# Patient Record
Sex: FEMALE | Race: AMERICAN INDIAN OR ALASKA NATIVE
[De-identification: names, ages, dates, MRNs, and addresses within clinical notes are randomized per-mention and may not be internally consistent; named-entity substitution may affect disease eponyms.]

---

## 2019-04-06 ENCOUNTER — HOSPITAL ENCOUNTER (EMERGENCY)
Dept: HOSPITAL 5 - ED | Age: 24
LOS: 1 days | Discharge: HOME | End: 2019-04-07
Payer: MEDICAID

## 2019-04-06 VITALS — DIASTOLIC BLOOD PRESSURE: 79 MMHG | SYSTOLIC BLOOD PRESSURE: 124 MMHG

## 2019-04-06 DIAGNOSIS — O23.41: Primary | ICD-10-CM

## 2019-04-06 DIAGNOSIS — R81: ICD-10-CM

## 2019-04-06 DIAGNOSIS — Z3A.10: ICD-10-CM

## 2019-04-06 DIAGNOSIS — O12.11: ICD-10-CM

## 2019-04-06 DIAGNOSIS — O26.891: ICD-10-CM

## 2019-04-06 LAB
BACTERIA #/AREA URNS HPF: (no result) /HPF
BASOPHILS # (AUTO): 0.1 K/MM3 (ref 0–0.1)
BASOPHILS NFR BLD AUTO: 0.6 % (ref 0–1.8)
BILIRUB UR QL STRIP: (no result)
BLOOD UR QL VISUAL: (no result)
BUN SERPL-MCNC: 12 MG/DL (ref 7–17)
BUN/CREAT SERPL: 24 %
CALCIUM SERPL-MCNC: 8.8 MG/DL (ref 8.4–10.2)
CAOX CRY #/AREA URNS HPF: (no result) /[HPF]
EOSINOPHIL # BLD AUTO: 0.1 K/MM3 (ref 0–0.4)
EOSINOPHIL NFR BLD AUTO: 0.8 % (ref 0–4.3)
HCT VFR BLD CALC: 36.1 % (ref 30.3–42.9)
HEMOLYSIS INDEX: 10
HGB BLD-MCNC: 12.4 GM/DL (ref 10.1–14.3)
LYMPHOCYTES # BLD AUTO: 2.2 K/MM3 (ref 1.2–5.4)
LYMPHOCYTES NFR BLD AUTO: 22.9 % (ref 13.4–35)
MCHC RBC AUTO-ENTMCNC: 35 % (ref 30–34)
MCV RBC AUTO: 75 FL (ref 79–97)
MONOCYTES # (AUTO): 0.6 K/MM3 (ref 0–0.8)
MONOCYTES % (AUTO): 6.8 % (ref 0–7.3)
MUCOUS THREADS #/AREA URNS HPF: (no result) /HPF
PH UR STRIP: 6 [PH] (ref 5–7)
PLATELET # BLD: 261 K/MM3 (ref 140–440)
RBC # BLD AUTO: 4.82 M/MM3 (ref 3.65–5.03)
RBC #/AREA URNS HPF: 37 /HPF (ref 0–6)
RENAL EPI CELLS #/AREA URNS LPF: 17 /LPF
UROBILINOGEN UR-MCNC: < 2 MG/DL (ref ?–2)
WBC #/AREA URNS HPF: 108 /HPF (ref 0–6)

## 2019-04-06 PROCEDURE — 81001 URINALYSIS AUTO W/SCOPE: CPT

## 2019-04-06 PROCEDURE — 86850 RBC ANTIBODY SCREEN: CPT

## 2019-04-06 PROCEDURE — 85025 COMPLETE CBC W/AUTO DIFF WBC: CPT

## 2019-04-06 PROCEDURE — 36415 COLL VENOUS BLD VENIPUNCTURE: CPT

## 2019-04-06 PROCEDURE — 86901 BLOOD TYPING SEROLOGIC RH(D): CPT

## 2019-04-06 PROCEDURE — 80048 BASIC METABOLIC PNL TOTAL CA: CPT

## 2019-04-06 PROCEDURE — 99284 EMERGENCY DEPT VISIT MOD MDM: CPT

## 2019-04-06 PROCEDURE — 86900 BLOOD TYPING SEROLOGIC ABO: CPT

## 2019-04-06 PROCEDURE — 84702 CHORIONIC GONADOTROPIN TEST: CPT

## 2019-04-06 PROCEDURE — 76801 OB US < 14 WKS SINGLE FETUS: CPT

## 2019-04-06 NOTE — EMERGENCY DEPARTMENT REPORT
Stated Complaint: VAG BLEEDING/10WKS PREG


Time Seen by Provider: 19 21:53





- HPI


History of Present Illness: 





vag bleeding in preg





m1





mse completed


MSE screening note: 


Focused history and physical exam performed.


Due to findings the following was ordered:











ED Disposition for MSE


Condition: Stable

## 2019-04-07 NOTE — EMERGENCY DEPARTMENT REPORT
ED Pregnancy HPI





- General


Chief complaint: Vaginal Bleeding


Stated complaint: VAG BLEEDING/10WKS PREG


Time Seen by Provider: 19 21:53


Source: patient, EMS


Mode of arrival: Stretcher


Limitations: No Limitations





- History of Present Illness


Initial comments: 





24-year-old -American female that is 10 weeks pregnant presents to the 

emergency room for one hour of vaginal bleeding prior to arrival.  Patient 

denies any abdominal pain.  Patient is  2 para 0.  She reports she is 

followed by [facility].  Patient states she will have spotting but 

has not filled a pad.  She denies any dysuria denies any abdominal pain.  Has no

past medical history surgical history D&C.  Patient currently takes prenatal 

vitamins and has no known drug allergies.


MD Complaint: vaginal bleeding


-: hour(s) (1 PTA)


Radiation: none


Severity scale (0 -10): 0


Improves with: none


Worsens with: none


Associated symptoms: vaginal bleeding


Vaginal bleeding: light


Pregnant:: Yes


Number of weeks pregnant: 10


OB History - Current Pregnancy: no complications


OB History - Previous Pregnancies: miscarriage


Pre- care: followed by OB





- Related Data


: 2


Para: 0


Ab: 1


                                  Previous Rx's











 Medication  Instructions  Recorded  Last Taken  Type


 


Nitrofurantoin Monohyd/M-Cryst 100 mg PO BID #14 capsule 19 Unknown Rx





[Macrobid 100 mg Capsule]    











                                    Allergies











Allergy/AdvReac Type Severity Reaction Status Date / Time


 


No Known Allergies Allergy   Unverified 19 22:07














ED Review of Systems


ROS: 


Stated complaint: VAG BLEEDING/10WKS PREG


Other details as noted in HPI





Constitutional: denies: chills, fever


Eyes: denies: eye pain, eye discharge, vision change


ENT: denies: ear pain, throat pain


Respiratory: denies: cough, shortness of breath, wheezing


Cardiovascular: denies: chest pain, palpitations


Endocrine: no symptoms reported


Gastrointestinal: denies: abdominal pain, nausea, diarrhea


Genitourinary: denies: urgency, dysuria, discharge


Musculoskeletal: denies: back pain, joint swelling, arthralgia


Skin: denies: rash, lesions


Neurological: denies: headache, weakness, paresthesias


Psychiatric: denies: anxiety, depression


Hematological/Lymphatic: denies: easy bleeding, easy bruising





ED Past Medical Hx





- Past Medical History


Previous Medical History?: No





- Surgical History


Past Surgical History?: No





- Social History


Smoking Status: Never Smoker


Substance Use Type: None





- Medications


Home Medications: 


                                Home Medications











 Medication  Instructions  Recorded  Confirmed  Last Taken  Type


 


Nitrofurantoin Monohyd/M-Cryst 100 mg PO BID #14 capsule 19  Unknown Rx





[Macrobid 100 mg Capsule]     














ED Physical Exam





- General


Limitations: No Limitations





ED Course


                                   Vital Signs











  19





  21:58


 


Temperature 98.3 F


 


Pulse Rate 95 H


 


Respiratory 16





Rate 


 


Blood Pressure 124/79


 


O2 Sat by Pulse 100





Oximetry 














ED Medical Decision Making





- Lab Data


Result diagrams: 


                                 19 21:57





                                 19 21:57








                                Laboratory Tests











  19





  21:57 21:57 21:57


 


WBC  9.4  


 


RBC  4.82  


 


Hgb  12.4  


 


Hct  36.1  


 


MCV  75 L  


 


MCH  26 L  


 


MCHC  35 H  


 


RDW  17.0 H  


 


Plt Count  261  


 


Lymph % (Auto)  22.9  


 


Mono % (Auto)  6.8  


 


Eos % (Auto)  0.8  


 


Baso % (Auto)  0.6  


 


Lymph #  2.2  


 


Mono #  0.6  


 


Eos #  0.1  


 


Baso #  0.1  


 


Seg Neutrophils %  68.9  


 


Seg Neutrophils #  6.5  


 


Sodium   136 L 


 


Potassium   3.7 


 


Chloride   101.7 


 


Carbon Dioxide   23 


 


Anion Gap   15 


 


BUN   12 


 


Creatinine   0.5 L 


 


Estimated GFR   > 60 


 


BUN/Creatinine Ratio   24 


 


Glucose   130 H 


 


Calcium   8.8 


 


HCG, Quant    48733 H


 


Urine Color   


 


Urine Turbidity   


 


Urine pH   


 


Ur Specific Gravity   


 


Urine Protein   


 


Urine Glucose (UA)   


 


Urine Ketones   


 


Urine Blood   


 


Urine Nitrite   


 


Urine Bilirubin   


 


Urine Urobilinogen   


 


Ur Leukocyte Esterase   


 


Urine WBC (Auto)   


 


Urine RBC (Auto)   


 


U Epithel Cells (Auto)   


 


Urine Bacteria (Auto)   


 


Ur Renal Epithelial Cell   


 


Calcium Oxalate Crystal   


 


Urine Mucus   


 


Blood Type   














  19





  21:57 22:56


 


WBC  


 


RBC  


 


Hgb  


 


Hct  


 


MCV  


 


MCH  


 


MCHC  


 


RDW  


 


Plt Count  


 


Lymph % (Auto)  


 


Mono % (Auto)  


 


Eos % (Auto)  


 


Baso % (Auto)  


 


Lymph #  


 


Mono #  


 


Eos #  


 


Baso #  


 


Seg Neutrophils %  


 


Seg Neutrophils #  


 


Sodium  


 


Potassium  


 


Chloride  


 


Carbon Dioxide  


 


Anion Gap  


 


BUN  


 


Creatinine  


 


Estimated GFR  


 


BUN/Creatinine Ratio  


 


Glucose  


 


Calcium  


 


HCG, Quant  


 


Urine Color   Trang


 


Urine Turbidity   Cloudy


 


Urine pH   6.0


 


Ur Specific Gravity   1.039 H


 


Urine Protein   100 mg/dl


 


Urine Glucose (UA)   50


 


Urine Ketones   Tr


 


Urine Blood   Lg


 


Urine Nitrite   Neg


 


Urine Bilirubin   Neg


 


Urine Urobilinogen   < 2.0


 


Ur Leukocyte Esterase   Tr


 


Urine WBC (Auto)   108.0 H


 


Urine RBC (Auto)   37.0


 


U Epithel Cells (Auto)   96.0 H


 


Urine Bacteria (Auto)   1+


 


Ur Renal Epithelial Cell   17


 


Calcium Oxalate Crystal   2+


 


Urine Mucus   3+


 


Blood Type  O POSITIVE 














- Radiology Data


Radiology results: report reviewed





Patient: PATRICIO CERVANTES MR 


#: [id number] 


: 1995 Acct:[id number] 





Age/Sex: 24 / F ADM Date: 19 





Loc: ED 


Attending Dr: 








Ordering Physician: SAIGE RODGERS 


Date of Service: 19 


Procedure(s): US OB <= 14 weeks fetus 


Accession Number(s): [id number] 





cc: SAIGE RODGERS 








PROCEDURE: US OB <= 14 WEEKS FETUS 





TECHNIQUE: Real-time transabdominal sonography of the uterus, placenta, amniotic

fluid, adnexa, 


and fetus was performed with image documentation. Measurements were obtained to 

determine fetal 


age/size. M-mode Doppler was used to document fetal heartbeat. 





ADDITIONAL GESTATION: None. 





HISTORY: VAG BLEEDING PREG 





COMPARISONS: None . 





FINDINGS: 





CRL: 40.2 mm, which corresponds to a gestational age of: 10 weeks, 6 days. 


Yolk Sac: Appropriate for gestational age. . 


Embryonic Cardiac Activity: 183 bpm . 


Gestational Sac: Size and shape are appropriate for gestational age. There is a 

small subchorionic


hematoma. 


Placenta: Normal 


Amniotic fluid: Appropriate for gestational age. 


Cervix: Normal. 


Right Ovary: Normal . 


Left Ovary: Normal . 


Estimated delivery date: 10/27/2019 . 


Uterus and adnexa: Normal. 





IMPRESSION: Single live intrauterine gestation at approximately 10 weeks and 6 

days . EDC by US 


10/27/2019 . 





This document is electronically signed by Joni Bear MD., 2019 

12:35:39 AM ET 





Transcribed By: CO 


Dictated By: JONI BEAR MD 


Electronically Authenticated By: JONI BEAR MD 


Signed Date/Time: 19 0037 











DD/DT: 19 


TD/TT: 19





- Medical Decision Making





Patient has been evaluated by this provider in fast track.


Basic labs hCG,  ultrasound OB


Patient be discharged home with instructions to follow up with her primary OB 

provider.  Patient verbalized understanding


Critical care attestation.: 


If time is entered above; I have spent that time in minutes in the direct care 

of this critically ill patient, excluding procedure time.








ED Disposition


Clinical Impression: 


 Spotting, Proteinuria affecting pregnancy in first trimester, Glucosuria





UTI (urinary tract infection)


Qualifiers:


 Urinary tract infection type: site unspecified Hematuria presence: with 

hematuria Qualified Code(s): N39.0 - Urinary tract infection, site not spe

cified; R31.9 - Hematuria, unspecified





Pregnancy


Qualifiers:


 Weeks of gestation: 11 weeks Qualified Code(s): Z3A.11 - 11 weeks gestation of 

pregnancy





Disposition: DC-01 TO HOME OR SELFCARE


Is pt being admited?: No


Does the pt Need Aspirin: No


Condition: Stable


Instructions:  Urinary Tract Infection in Children (ED)


Additional Instructions: 


Follow-up with her OB/GYN provider the next 3-5 days.


Prescriptions: 


Nitrofurantoin Monohyd/M-Cryst [Macrobid 100 mg Capsule] 100 mg PO BID #14 

capsule


Referrals: 


PRIMARY CARE,MD [Primary Care Provider] - 3-5 Days


Forms:  Accompanied Note, Work/School Release Form(ED)

## 2019-04-07 NOTE — ULTRASOUND REPORT
PROCEDURE: US OB <= 14 WEEKS FETUS 

 

TECHNIQUE:  Real-time transabdominal sonography of the uterus, placenta, amniotic fluid, adnexa, and 
fetus was performed with image documentation. Measurements were obtained to determine fetal age/size.
 M-mode Doppler was used to document fetal heartbeat.   

 

ADDITIONAL GESTATION: None. 

 

HISTORY: VAG BLEEDING PREG 

 

COMPARISONS:  None . 

 

FINDINGS: 

 

CRL:  40.2 mm, which corresponds to a gestational age of:  10 weeks, 6 days.     

Yolk Sac:  Appropriate for gestational age. . 

Embryonic Cardiac Activity:  183 bpm .  

Gestational Sac:  Size and shape are appropriate for gestational age. There is a small subchorionic h
ematoma. 

Placenta: Normal 

Amniotic fluid: Appropriate for gestational age. 

Cervix: Normal. 

Right Ovary:  Normal . 

Left Ovary:  Normal . 

Estimated delivery date:  10/27/2019 . 

Uterus and adnexa: Normal. 

 

IMPRESSION:  Single live intrauterine gestation at approximately 10 weeks and 6 days .  EDC by US 10/
27/2019 . 

 

This document is electronically signed by Joni Gill MD., April 7 2019 12:35:39 AM ET

## 2019-08-31 ENCOUNTER — HOSPITAL ENCOUNTER (OUTPATIENT)
Dept: HOSPITAL 5 - TRG | Age: 24
Discharge: HOME | End: 2019-08-31
Attending: OBSTETRICS & GYNECOLOGY
Payer: MEDICAID

## 2019-08-31 VITALS — SYSTOLIC BLOOD PRESSURE: 110 MMHG | DIASTOLIC BLOOD PRESSURE: 65 MMHG

## 2019-08-31 DIAGNOSIS — R10.10: ICD-10-CM

## 2019-08-31 DIAGNOSIS — O60.03: Primary | ICD-10-CM

## 2019-08-31 DIAGNOSIS — R10.30: ICD-10-CM

## 2019-08-31 DIAGNOSIS — Z3A.32: ICD-10-CM

## 2019-08-31 DIAGNOSIS — O26.893: ICD-10-CM

## 2019-08-31 PROCEDURE — 59025 FETAL NON-STRESS TEST: CPT
